# Patient Record
Sex: FEMALE | Race: WHITE | Employment: FULL TIME | URBAN - NONMETROPOLITAN AREA
[De-identification: names, ages, dates, MRNs, and addresses within clinical notes are randomized per-mention and may not be internally consistent; named-entity substitution may affect disease eponyms.]

---
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
## 2025-05-28 NOTE — ED NOTES
ASSESSMENT:    SKIN:  Warm, dry, pink. Cap refill < 2 sec  CARDIAC:  S1 S2 noted  LUNGS: clear upper and lower lobes. Respirations even and unlabored. ABDOMEN: bowel sounds noted upper and lower quadrants. Soft and tender. Intermittent LLQ pain. EXTREMITIES: bilateral DP and PT. No edema noted. Pt alert and oriented x4. Pupils equal and reactive. No distress noted. Side rails up and call light within reach.        Bertin Waldrop RN  03/04/22 1976
Discharge teaching provided, pt and mother verbalized understanding. Questions invited and answered.      Eryn Cisneros RN  03/04/22 1031
PT ambulated to b/r and back without incident or assistance.       Eryn Cisneros RN  03/04/22 9226
Patient placed on cardiac monitor, continuous pulse oximeter, and NIBP monitor. Monitor alarms on.        Yessi Henderson RN  03/04/22 8891
Patient/Caregiver provided printed discharge information.